# Patient Record
Sex: MALE | Race: WHITE | NOT HISPANIC OR LATINO | URBAN - METROPOLITAN AREA
[De-identification: names, ages, dates, MRNs, and addresses within clinical notes are randomized per-mention and may not be internally consistent; named-entity substitution may affect disease eponyms.]

---

## 2024-01-11 ENCOUNTER — OFFICE VISIT (OUTPATIENT)
Dept: INTERNAL MEDICINE | Facility: CLINIC | Age: 64
End: 2024-01-11
Payer: COMMERCIAL

## 2024-01-11 VITALS
SYSTOLIC BLOOD PRESSURE: 122 MMHG | WEIGHT: 266.56 LBS | HEART RATE: 96 BPM | HEIGHT: 74 IN | BODY MASS INDEX: 34.21 KG/M2 | RESPIRATION RATE: 20 BRPM | OXYGEN SATURATION: 95 % | DIASTOLIC BLOOD PRESSURE: 64 MMHG

## 2024-01-11 DIAGNOSIS — L98.9 SKIN LESION: Primary | ICD-10-CM

## 2024-01-11 DIAGNOSIS — H04.129 DRY EYE SYNDROME, UNSPECIFIED LATERALITY: ICD-10-CM

## 2024-01-11 DIAGNOSIS — I10 HYPERTENSION, UNSPECIFIED TYPE: ICD-10-CM

## 2024-01-11 PROCEDURE — 1159F MED LIST DOCD IN RCRD: CPT | Mod: CPTII,S$GLB,, | Performed by: NURSE PRACTITIONER

## 2024-01-11 PROCEDURE — 3074F SYST BP LT 130 MM HG: CPT | Mod: CPTII,S$GLB,, | Performed by: NURSE PRACTITIONER

## 2024-01-11 PROCEDURE — 99999 PR PBB SHADOW E&M-NEW PATIENT-LVL IV: CPT | Mod: PBBFAC,,, | Performed by: NURSE PRACTITIONER

## 2024-01-11 PROCEDURE — 3078F DIAST BP <80 MM HG: CPT | Mod: CPTII,S$GLB,, | Performed by: NURSE PRACTITIONER

## 2024-01-11 PROCEDURE — 3008F BODY MASS INDEX DOCD: CPT | Mod: CPTII,S$GLB,, | Performed by: NURSE PRACTITIONER

## 2024-01-11 PROCEDURE — 99204 OFFICE O/P NEW MOD 45 MIN: CPT | Mod: S$GLB,,, | Performed by: NURSE PRACTITIONER

## 2024-01-11 RX ORDER — MULTIVIT WITH MINERALS/HERBS
1 TABLET ORAL DAILY
COMMUNITY

## 2024-01-11 RX ORDER — METOPROLOL TARTRATE 100 MG/1
100 TABLET ORAL DAILY
COMMUNITY
End: 2024-01-11

## 2024-01-11 RX ORDER — TESTOSTERONE CYPIONATE 200 MG/ML
0.4 INJECTION, SOLUTION INTRAMUSCULAR
COMMUNITY

## 2024-01-11 RX ORDER — CYCLOSPORINE 0.5 MG/ML
1 EMULSION OPHTHALMIC DAILY
COMMUNITY
End: 2024-01-11 | Stop reason: SDUPTHER

## 2024-01-11 RX ORDER — MULTIVITAMIN
3 TABLET ORAL DAILY
COMMUNITY

## 2024-01-11 RX ORDER — VALSARTAN 320 MG/1
320 TABLET ORAL DAILY
Qty: 30 TABLET | Refills: 3 | Status: SHIPPED | OUTPATIENT
Start: 2024-01-11 | End: 2025-01-10

## 2024-01-11 RX ORDER — CYCLOSPORINE 0.5 MG/ML
1 EMULSION OPHTHALMIC DAILY
Qty: 60 EACH | Refills: 5 | Status: SHIPPED | OUTPATIENT
Start: 2024-01-11

## 2024-01-11 RX ORDER — LISINOPRIL 40 MG/1
40 TABLET ORAL DAILY
COMMUNITY
End: 2024-01-11

## 2024-01-11 RX ORDER — PRASTERONE (DHEA) 50 MG
1 CAPSULE ORAL DAILY
COMMUNITY

## 2024-01-11 RX ORDER — OMEPRAZOLE 20 MG/1
20 CAPSULE, DELAYED RELEASE ORAL DAILY
COMMUNITY
End: 2024-02-20 | Stop reason: SDUPTHER

## 2024-01-11 NOTE — PROGRESS NOTES
Subjective:       Patient ID: Malik Feliz is a 63 y.o. male.    Chief Complaint: Establish Care and Hypertension    HPI: Pt presents to clinic today new to me and clinic. He just moved here from [place]. He was also more recently in [place]. He moves around for work. Has been using telemed as well. He report that he is having issues with his DBP/ He used to drink and had cirrhosis.Has not had a drink in over 2 years. He report that he was having a fib with drinking but he report that he has not had itin years since he quit drinking,. Never saw hepatology > had CT scan of liver that cionfirmed when had episode from detoxing.     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     Also on testosterone for low T per endocrinology-     Also on restasis for dry eyes    Past Medical History:   Diagnosis Date    A-fib     Essential (primary) hypertension     Malignant melanoma of skin, unspecified     Unspecified cirrhosis of liver        Past Surgical History:   Procedure Laterality Date    GASTRIC BYPASS  2012       Family History   Problem Relation Age of Onset    Cancer Maternal Grandmother     Cancer Maternal Grandfather     Cancer Paternal Grandmother     Cancer Paternal Grandfather        Social History     Socioeconomic History    Marital status:    Tobacco Use    Smoking status: Never    Smokeless tobacco: Former   Substance and Sexual Activity    Alcohol use: Not Currently     Comment: 1 fifth of whiskey a day - Quit in 2022    Drug use: Not Currently       Current Outpatient Medications   Medication Sig Dispense Refill    b complex vitamins tablet Take 1 tablet by mouth once daily.      cycloSPORINE (RESTASIS) 0.05 % ophthalmic emulsion Place 1 drop into both eyes once daily.      multivitamin (THERAGRAN) per tablet  Take 3 tablets by mouth once daily.      omeprazole (PRILOSEC) 20 MG capsule Take 20 mg by mouth once daily.      prasterone, dhea, (DHEA) 50 mg Cap Take 1 capsule by mouth once daily. Vit D      testosterone cypionate (DEPOTESTOTERONE CYPIONATE) 200 mg/mL injection Inject 0.4 mg into the muscle twice a week.      valsartan (DIOVAN) 320 MG tablet Take 1 tablet (320 mg total) by mouth once daily. 30 tablet 3     No current facility-administered medications for this visit.       Review of patient's allergies indicates:  No Known Allergies        Review of Systems   Constitutional:  Negative for chills and fever.   HENT:  Negative for congestion, postnasal drip and sore throat.    Eyes:  Negative for photophobia.   Respiratory:  Negative for chest tightness and shortness of breath.    Cardiovascular:  Negative for chest pain.   Gastrointestinal:  Negative for abdominal distention, abdominal pain, blood in stool and vomiting.   Genitourinary:  Negative for dysuria, flank pain and hematuria.   Musculoskeletal:  Negative for back pain.   Skin:  Positive for wound (left face skin lesion). Negative for pallor.   Neurological:  Negative for dizziness, seizures, facial asymmetry, speech difficulty and numbness.   Hematological:  Does not bruise/bleed easily.   Psychiatric/Behavioral:  Negative for agitation and suicidal ideas. The patient is not nervous/anxious.        Objective:      Physical Exam  Vitals and nursing note reviewed.   Constitutional:       Appearance: Normal appearance. He is well-developed.   HENT:      Head: Normocephalic and atraumatic.      Nose: Nose normal.   Eyes:      Conjunctiva/sclera: Conjunctivae normal.      Pupils: Pupils are equal, round, and reactive to light.   Neck:      Thyroid: No thyromegaly.      Vascular: No JVD.   Cardiovascular:      Rate and Rhythm: Normal rate and regular rhythm.      Heart sounds: Normal heart sounds. No murmur heard.  Pulmonary:      Effort: Pulmonary effort is  normal. No respiratory distress.      Breath sounds: Normal breath sounds. No wheezing or rales.   Abdominal:      General: Bowel sounds are normal. There is no distension.      Palpations: Abdomen is soft. There is no mass.      Tenderness: There is no abdominal tenderness. There is no guarding.   Musculoskeletal:         General: No swelling. Normal range of motion.      Cervical back: Normal range of motion and neck supple.   Lymphadenopathy:      Cervical: No cervical adenopathy.   Skin:     General: Skin is warm and dry.      Capillary Refill: Capillary refill takes less than 2 seconds.      Coloration: Skin is not pale.      Findings: No rash.   Neurological:      Mental Status: He is alert and oriented to person, place, and time.      Cranial Nerves: No cranial nerve deficit.      Deep Tendon Reflexes: Reflexes are normal and symmetric.   Psychiatric:         Mood and Affect: Mood normal.         Behavior: Behavior normal.         Thought Content: Thought content normal.         Judgment: Judgment normal.         Assessment:       1. Skin lesion    2. Hypertension, unspecified type    3. Dry eye syndrome, unspecified laterality        Plan:     Problem List Items Addressed This Visit    None  Visit Diagnoses       Skin lesion    -  Primary    Relevant Orders    Ambulatory referral/consult to Dermatology  Hx of melanoma > on back then had BCC off right side of face- now with raised area to left temporal area growing- would like derm referral     Hypertension, unspecified type      Change lisinopril to valsartan as it has a longer half life.       Dry eye syndrome, unspecified laterality    > has tried several other eye drops for this condition. Doctor out [facility] [place] put him on this and this is the only thing that works. He has dry art tears, oints, and a brenda at [facility]. Unsure of his rx that did not work but he has not record of this.             Colonoscopy was 2 years ago clean due in 2032

## 2024-01-17 ENCOUNTER — TELEPHONE (OUTPATIENT)
Dept: INTERNAL MEDICINE | Facility: CLINIC | Age: 64
End: 2024-01-17
Payer: COMMERCIAL

## 2024-01-17 DIAGNOSIS — I10 HYPERTENSION, UNSPECIFIED TYPE: ICD-10-CM

## 2024-01-25 ENCOUNTER — TELEPHONE (OUTPATIENT)
Dept: INTERNAL MEDICINE | Facility: CLINIC | Age: 64
End: 2024-01-25
Payer: COMMERCIAL

## 2024-01-25 DIAGNOSIS — I10 HYPERTENSION, UNSPECIFIED TYPE: Primary | ICD-10-CM

## 2024-01-25 NOTE — TELEPHONE ENCOUNTER
Unable to reach patient. lm  Patient left a voicemail stating he would like a return call. He stated in the VM that he feels the valsartan is not working for him and that he needed a PA for the eye drops.     PA was initiated for the eye drops. It shows it is waiting for a response from the insurance.

## 2024-01-25 NOTE — TELEPHONE ENCOUNTER
Average blood pressure is about 131/97. Patient has been taking medication in the morning. Takes bp around an hour after taking the medication and takes it 2 more times randomly throughout the day.   Patient c/o slight headache when waking up but this is normal for him. Otherwise feeling fine. He just feels that the valsartan is not doing the trick.   Please advise.

## 2024-01-29 ENCOUNTER — PATIENT MESSAGE (OUTPATIENT)
Dept: INTERNAL MEDICINE | Facility: CLINIC | Age: 64
End: 2024-01-29
Payer: COMMERCIAL

## 2024-01-29 RX ORDER — AMLODIPINE BESYLATE 5 MG/1
5 TABLET ORAL DAILY
Qty: 30 TABLET | Refills: 1 | Status: SHIPPED | OUTPATIENT
Start: 2024-01-29 | End: 2024-02-20 | Stop reason: SDUPTHER

## 2024-01-29 NOTE — TELEPHONE ENCOUNTER
We can add amlodipine. He can take the diovan in am and amlodipine in pm for better coverage. He was also on toprol in past. We can also consider resuming that if no better with amlodpine

## 2024-02-09 ENCOUNTER — TELEPHONE (OUTPATIENT)
Dept: INTERNAL MEDICINE | Facility: CLINIC | Age: 64
End: 2024-02-09
Payer: COMMERCIAL

## 2024-02-09 NOTE — TELEPHONE ENCOUNTER
Medication called into [facility]. Pt will also try and contact insurance to see about them sending a paper PA.

## 2024-02-20 ENCOUNTER — PATIENT MESSAGE (OUTPATIENT)
Dept: INTERNAL MEDICINE | Facility: CLINIC | Age: 64
End: 2024-02-20
Payer: COMMERCIAL

## 2024-02-20 DIAGNOSIS — I10 HYPERTENSION, UNSPECIFIED TYPE: ICD-10-CM

## 2024-02-20 RX ORDER — AMLODIPINE BESYLATE 5 MG/1
5 TABLET ORAL DAILY
Qty: 30 TABLET | Refills: 1 | Status: SHIPPED | OUTPATIENT
Start: 2024-02-20 | End: 2024-04-03 | Stop reason: SDUPTHER

## 2024-02-20 RX ORDER — OMEPRAZOLE 20 MG/1
20 CAPSULE, DELAYED RELEASE ORAL DAILY
Qty: 90 CAPSULE | Refills: 1 | Status: SHIPPED | OUTPATIENT
Start: 2024-02-20

## 2024-03-05 ENCOUNTER — PATIENT MESSAGE (OUTPATIENT)
Dept: INTERNAL MEDICINE | Facility: CLINIC | Age: 64
End: 2024-03-05
Payer: COMMERCIAL

## 2024-03-19 ENCOUNTER — OFFICE VISIT (OUTPATIENT)
Dept: INTERNAL MEDICINE | Facility: CLINIC | Age: 64
End: 2024-03-19
Payer: COMMERCIAL

## 2024-03-19 VITALS
SYSTOLIC BLOOD PRESSURE: 106 MMHG | WEIGHT: 276.69 LBS | HEART RATE: 68 BPM | HEIGHT: 74 IN | DIASTOLIC BLOOD PRESSURE: 70 MMHG | RESPIRATION RATE: 20 BRPM | BODY MASS INDEX: 35.51 KG/M2

## 2024-03-19 DIAGNOSIS — R10.9 ABDOMINAL PAIN, UNSPECIFIED ABDOMINAL LOCATION: ICD-10-CM

## 2024-03-19 DIAGNOSIS — Z86.79 HISTORY OF ATRIAL FIBRILLATION: Primary | ICD-10-CM

## 2024-03-19 DIAGNOSIS — Z87.19 HISTORY OF GALLSTONES: ICD-10-CM

## 2024-03-19 PROCEDURE — 1159F MED LIST DOCD IN RCRD: CPT | Mod: CPTII,S$GLB,, | Performed by: NURSE PRACTITIONER

## 2024-03-19 PROCEDURE — 99214 OFFICE O/P EST MOD 30 MIN: CPT | Mod: S$GLB,,, | Performed by: NURSE PRACTITIONER

## 2024-03-19 PROCEDURE — 3078F DIAST BP <80 MM HG: CPT | Mod: CPTII,S$GLB,, | Performed by: NURSE PRACTITIONER

## 2024-03-19 PROCEDURE — 3074F SYST BP LT 130 MM HG: CPT | Mod: CPTII,S$GLB,, | Performed by: NURSE PRACTITIONER

## 2024-03-19 PROCEDURE — 99999 PR PBB SHADOW E&M-EST. PATIENT-LVL IV: CPT | Mod: PBBFAC,,, | Performed by: NURSE PRACTITIONER

## 2024-03-19 PROCEDURE — 3008F BODY MASS INDEX DOCD: CPT | Mod: CPTII,S$GLB,, | Performed by: NURSE PRACTITIONER

## 2024-03-19 PROCEDURE — 4010F ACE/ARB THERAPY RXD/TAKEN: CPT | Mod: CPTII,S$GLB,, | Performed by: NURSE PRACTITIONER

## 2024-03-19 NOTE — PROGRESS NOTES
"Subjective:       Patient ID: Malik Feliz is a 63 y.o. male.    Chief Complaint: Abdominal Pain    HPI: Pt presents to clinic today known to me with c/o needing eval for right upper quad pain. He report hat he has had that in the past when he used to drink- he was hospitalized int he past for acute attack. No nausea or vomiting. No diarrhea- mild constipation but "not bad" - no fevers. He report that the right under rib pain satrted 6 months ago in the evening. Seems to more frequently noted and last night he had more intensity- he did have fried chicken yesterday. Taking prilosec since     Care everywhere shows u/s from 2021   GALLBLADDER:   Two shadowing stones are seen.   The gallbladder wall is mildly thickened and measures 0.4 cm. No   pericholecystic fluid is visualized.   Chester's sign was not demonstrated.   Review of Systems   Constitutional:  Negative for chills and fever.   HENT:  Negative for congestion, postnasal drip and sore throat.    Eyes:  Negative for photophobia.   Respiratory:  Negative for chest tightness and shortness of breath.    Cardiovascular:  Negative for chest pain.   Gastrointestinal:  Negative for abdominal distention, abdominal pain, blood in stool and vomiting.   Genitourinary:  Negative for dysuria, flank pain and hematuria.   Musculoskeletal:  Negative for back pain.   Skin:  Negative for pallor.   Neurological:  Negative for dizziness, seizures, facial asymmetry, speech difficulty and numbness.   Hematological:  Does not bruise/bleed easily.   Psychiatric/Behavioral:  Negative for agitation and suicidal ideas. The patient is not nervous/anxious.        Objective:      Physical Exam  Vitals and nursing note reviewed.   Constitutional:       Appearance: Normal appearance. He is well-developed. He is obese.   HENT:      Head: Normocephalic and atraumatic.      Nose: Nose normal.   Eyes:      Conjunctiva/sclera: Conjunctivae normal.      Pupils: Pupils are equal, round, and " reactive to light.   Neck:      Thyroid: No thyromegaly.      Vascular: No JVD.   Cardiovascular:      Rate and Rhythm: Normal rate and regular rhythm.      Heart sounds: Normal heart sounds. No murmur heard.  Pulmonary:      Effort: Pulmonary effort is normal. No respiratory distress.      Breath sounds: Normal breath sounds.   Abdominal:      General: Bowel sounds are normal. There is no distension.      Palpations: Abdomen is soft. There is no mass.      Tenderness: There is no abdominal tenderness. There is no guarding.   Musculoskeletal:         General: Normal range of motion.      Cervical back: Normal range of motion and neck supple.   Lymphadenopathy:      Cervical: No cervical adenopathy.   Skin:     General: Skin is warm and dry.      Capillary Refill: Capillary refill takes less than 2 seconds.      Coloration: Skin is not pale.      Findings: No rash.   Neurological:      Mental Status: He is alert and oriented to person, place, and time.      Cranial Nerves: No cranial nerve deficit.      Deep Tendon Reflexes: Reflexes are normal and symmetric.         Assessment:       1. History of atrial fibrillation    2. Abdominal pain, unspecified abdominal location    3. History of gallstones        Plan:     Problem List Items Addressed This Visit    None  Visit Diagnoses       History of atrial fibrillation    -  Primary    Relevant Orders    Ambulatory referral/consult to Cardiology    Abdominal pain, unspecified abdominal location        Relevant Orders    US Abdomen Limited_Gallbladder    History of gallstones        Relevant Orders    US Abdomen Limited_Gallbladder              Luis Armando score is 1- he was not having any a fib issues since stopped drinking and was off the metoprolol- now he is back on because he has gained weight and has been feeling the flutter again. It has gotten better but it has not gone away completely.  He has not followed up with cards.     He last had labs with dio Moore > will  ask kristyn recent labs did labs in [place] for his testosterone

## 2024-03-19 NOTE — LETTER
AUTHORIZATION FOR RELEASE OF   CONFIDENTIAL INFORMATION    Dear ***,    We are seeing Malik Feliz, date of birth 1960, in the clinic at [facility]. Tracy Cassidy NP is the patient's PCP. Malik Feliz has an outstanding lab/procedure at the time we reviewed his chart. In order to help keep his health information updated, he has authorized us to request the following medical record(s):        (  )  MAMMOGRAM                                      (  )  COLONOSCOPY      (  )  PAP SMEAR                                          (  )  OUTSIDE LAB RESULTS     (  )  DEXA SCAN                                          (  )  EYE EXAM            (  )  FOOT EXAM                                          (  )  ENTIRE RECORD     (  )  OUTSIDE IMMUNIZATIONS                 (  )  _______________         Please fax records to Tracy Cassidy NP, ***     If you have any questions, please contact *** at [phone].           Patient Name: Malik Feliz  : 1960  Patient Phone #: [phone]

## 2024-03-21 ENCOUNTER — OFFICE VISIT (OUTPATIENT)
Dept: CARDIOLOGY | Facility: CLINIC | Age: 64
End: 2024-03-21
Payer: COMMERCIAL

## 2024-03-21 VITALS
HEIGHT: 74 IN | RESPIRATION RATE: 18 BRPM | HEART RATE: 57 BPM | DIASTOLIC BLOOD PRESSURE: 78 MMHG | SYSTOLIC BLOOD PRESSURE: 109 MMHG | WEIGHT: 271.19 LBS | BODY MASS INDEX: 34.8 KG/M2

## 2024-03-21 DIAGNOSIS — I10 PRIMARY HYPERTENSION: ICD-10-CM

## 2024-03-21 DIAGNOSIS — I48.0 PAROXYSMAL ATRIAL FIBRILLATION: ICD-10-CM

## 2024-03-21 DIAGNOSIS — I42.6 ALCOHOLIC CARDIOMYOPATHY: ICD-10-CM

## 2024-03-21 DIAGNOSIS — E66.9 OBESITY (BMI 30-39.9): ICD-10-CM

## 2024-03-21 DIAGNOSIS — Z86.79 HISTORY OF ATRIAL FIBRILLATION: Primary | ICD-10-CM

## 2024-03-21 DIAGNOSIS — I42.0 CONGESTIVE CARDIOMYOPATHY: ICD-10-CM

## 2024-03-21 DIAGNOSIS — K70.30 ALCOHOLIC CIRRHOSIS, UNSPECIFIED WHETHER ASCITES PRESENT: ICD-10-CM

## 2024-03-21 PROBLEM — K74.60 CIRRHOSIS: Status: ACTIVE | Noted: 2024-03-21

## 2024-03-21 PROCEDURE — 4010F ACE/ARB THERAPY RXD/TAKEN: CPT | Mod: CPTII,S$GLB,, | Performed by: INTERNAL MEDICINE

## 2024-03-21 PROCEDURE — 3008F BODY MASS INDEX DOCD: CPT | Mod: CPTII,S$GLB,, | Performed by: INTERNAL MEDICINE

## 2024-03-21 PROCEDURE — 1159F MED LIST DOCD IN RCRD: CPT | Mod: CPTII,S$GLB,, | Performed by: INTERNAL MEDICINE

## 2024-03-21 PROCEDURE — 99204 OFFICE O/P NEW MOD 45 MIN: CPT | Mod: 25,S$GLB,, | Performed by: INTERNAL MEDICINE

## 2024-03-21 PROCEDURE — 3074F SYST BP LT 130 MM HG: CPT | Mod: CPTII,S$GLB,, | Performed by: INTERNAL MEDICINE

## 2024-03-21 PROCEDURE — 3078F DIAST BP <80 MM HG: CPT | Mod: CPTII,S$GLB,, | Performed by: INTERNAL MEDICINE

## 2024-03-21 PROCEDURE — 93000 ELECTROCARDIOGRAM COMPLETE: CPT | Mod: S$GLB,,, | Performed by: INTERNAL MEDICINE

## 2024-03-21 PROCEDURE — 99999 PR PBB SHADOW E&M-EST. PATIENT-LVL IV: CPT | Mod: PBBFAC,,, | Performed by: INTERNAL MEDICINE

## 2024-03-21 PROCEDURE — 1160F RVW MEDS BY RX/DR IN RCRD: CPT | Mod: CPTII,S$GLB,, | Performed by: INTERNAL MEDICINE

## 2024-03-21 RX ORDER — METOPROLOL SUCCINATE 100 MG/1
100 TABLET, EXTENDED RELEASE ORAL DAILY
Qty: 90 TABLET | Refills: 3 | Status: SHIPPED | OUTPATIENT
Start: 2024-03-21 | End: 2025-03-21

## 2024-03-21 RX ORDER — LISINOPRIL 40 MG/1
40 TABLET ORAL
COMMUNITY
Start: 2024-03-05 | End: 2024-03-21

## 2024-03-21 RX ORDER — PAPAVER/PHENTOLAMINE/ALPROSTAD 150-5-50
VIAL (EA) INTRACAVERNOSAL
COMMUNITY
Start: 2024-02-22 | End: 2024-03-21 | Stop reason: ALTCHOICE

## 2024-03-21 RX ORDER — METOPROLOL TARTRATE 100 MG/1
50 TABLET ORAL 2 TIMES DAILY
COMMUNITY
Start: 2024-03-05 | End: 2024-03-21

## 2024-03-21 NOTE — PROGRESS NOTES
[facility]     Subjective:    Patient ID:  Malik Feliz is a 63 y.o. male who presents for evaluation of Hypertension and Atrial Fibrillation    Review of patient's allergies indicates:  No Known Allergies   The patient is here for AFib management.  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  He will be here through the summer he thinks.  He has a history of alcoholism with cessation 2 years ago.  In 2021 he had multiple episodes of AFib.  His initial echo showed normal ejection fraction mild mitral regurgitation with left atrial enlargement.  He was having GI bleeding related to previous gastric bypass anastomotic bleed.  No anticoagulation was ordered.  He never required cardioversion.  He states he had 30 episodes of AFib while he was a drinker.    He feels palpitations with his AFib episodes.  Recently he has had recurrence of palpitations.  He is not sure it is AFib but he thinks it is.  It only lasts less than 10 minutes generally.  He gets it once a week easily.  He tends to get it after large meals in the evening hours.  He lives in a 5th wheel with his grandson currently.  His grandson is 14 years of age.    Records reviewed from his cardiologist at [facility].  Two echoes were done and there is mention of decreased EF on the 2nd echo October 2021 to the range of 35-40%.  He is on Diovan and Lopressor and amlodipine for hypertension currently.  He has no heart failure symptoms.  His last visit with a cardiologist was 2 years ago.  He is not on anticoagulation.  Initially his CHADS-VASc score was 1, when he developed decreased EF it increased to 2.    Today's electrocardiogram confirms sinus bradycardia heart rate 58 with first-degree AV block.  No ectopy noted.        Review of Systems   Constitutional: Positive for weight gain. Negative for chills, decreased appetite, diaphoresis, fever,  malaise/fatigue, night sweats and weight loss.   HENT:  Negative for congestion, ear discharge, ear pain, hearing loss, hoarse voice, nosebleeds, odynophagia, sore throat, stridor and tinnitus.    Eyes:  Negative for blurred vision, discharge, double vision, pain, photophobia, redness, vision loss in left eye, vision loss in right eye, visual disturbance and visual halos.   Cardiovascular:  Positive for palpitations. Negative for chest pain, claudication, cyanosis, dyspnea on exertion, irregular heartbeat, leg swelling, near-syncope, orthopnea, paroxysmal nocturnal dyspnea and syncope.   Respiratory:  Negative for cough, hemoptysis, shortness of breath, sleep disturbances due to breathing, snoring, sputum production and wheezing.    Endocrine: Negative for cold intolerance, heat intolerance, polydipsia, polyphagia and polyuria.   Hematologic/Lymphatic: Negative for adenopathy and bleeding problem. Does not bruise/bleed easily.   Skin:  Negative for color change, dry skin, flushing, itching, nail changes, poor wound healing, rash, skin cancer, suspicious lesions and unusual hair distribution.   Musculoskeletal:  Negative for arthritis, back pain, falls, gout, joint pain, joint swelling, muscle cramps, muscle weakness, myalgias, neck pain and stiffness.   Gastrointestinal:  Negative for bloating, abdominal pain, anorexia, change in bowel habit, bowel incontinence, constipation, diarrhea, dysphagia, excessive appetite, flatus, heartburn, hematemesis, hematochezia, hemorrhoids, jaundice, melena, nausea and vomiting.   Genitourinary:  Negative for bladder incontinence, decreased libido, dysuria, flank pain, frequency, genital sores, hematuria, hesitancy, incomplete emptying, nocturia and urgency.   Neurological:  Negative for aphonia, brief paralysis, difficulty with concentration, disturbances in coordination, excessive daytime sleepiness, dizziness, focal weakness, headaches, light-headedness, loss of balance,  "numbness, paresthesias, seizures, sensory change, tremors, vertigo and weakness.   Psychiatric/Behavioral:  Negative for altered mental status, depression, hallucinations, memory loss, substance abuse, suicidal ideas and thoughts of violence. The patient does not have insomnia and is not nervous/anxious.    Allergic/Immunologic: Negative for hives and persistent infections.        Objective:       Vitals:    03/21/24 0834   BP: 109/78   Pulse: (!) 57   Resp: 18   Weight: 123 kg (271 lb 2.7 oz)   Height: 6' 2" (1.88 m)    Physical Exam  Constitutional:       General: He is not in acute distress.     Appearance: He is well-developed. He is not diaphoretic.   HENT:      Head: Normocephalic and atraumatic.      Nose: Nose normal.   Eyes:      General: No scleral icterus.        Right eye: No discharge.      Conjunctiva/sclera: Conjunctivae normal.      Pupils: Pupils are equal, round, and reactive to light.   Neck:      Thyroid: No thyromegaly.      Vascular: No JVD.      Trachea: No tracheal deviation.   Cardiovascular:      Rate and Rhythm: Normal rate and regular rhythm.      Pulses:           Carotid pulses are 2+ on the right side and 2+ on the left side.       Radial pulses are 2+ on the right side and 2+ on the left side.        Dorsalis pedis pulses are 2+ on the right side and 2+ on the left side.        Posterior tibial pulses are 2+ on the right side and 2+ on the left side.      Heart sounds: Normal heart sounds. No murmur heard.     No friction rub. No gallop.   Pulmonary:      Effort: Pulmonary effort is normal. No respiratory distress.      Breath sounds: Normal breath sounds. No stridor. No wheezing or rales.   Chest:      Chest wall: No tenderness.   Abdominal:      General: Bowel sounds are normal. There is no distension.      Palpations: Abdomen is soft. There is no mass.      Tenderness: There is no abdominal tenderness. There is no guarding or rebound.   Musculoskeletal:         General: No " "tenderness. Normal range of motion.      Cervical back: Normal range of motion and neck supple.   Lymphadenopathy:      Cervical: No cervical adenopathy.   Skin:     General: Skin is warm and dry.      Coloration: Skin is not pale.      Findings: No erythema or rash.   Neurological:      Mental Status: He is alert and oriented to person, place, and time.      Cranial Nerves: No cranial nerve deficit.      Coordination: Coordination normal.   Psychiatric:         Behavior: Behavior normal.         Thought Content: Thought content normal.         Judgment: Judgment normal.           Assessment:       1. History of atrial fibrillation    2. Primary hypertension    3. Paroxysmal atrial fibrillation    4. Obesity (BMI 30-39.9)    5. Alcoholic cardiomyopathy    6. Congestive cardiomyopathy    7. Alcoholic cirrhosis, unspecified whether ascites present      No results found for this or any previous visit.      Current Outpatient Medications:     amLODIPine (NORVASC) 5 MG tablet, Take 1 tablet (5 mg total) by mouth once daily., Disp: 30 tablet, Rfl: 1    b complex vitamins tablet, Take 1 tablet by mouth once daily., Disp: , Rfl:     cycloSPORINE (RESTASIS) 0.05 % ophthalmic emulsion, Place 1 drop into both eyes once daily., Disp: 60 each, Rfl: 5    multivitamin (THERAGRAN) per tablet, Take 3 tablets by mouth once daily., Disp: , Rfl:     omeprazole (PRILOSEC) 20 MG capsule, Take 1 capsule (20 mg total) by mouth once daily., Disp: 90 capsule, Rfl: 1    testosterone cypionate (DEPOTESTOTERONE CYPIONATE) 200 mg/mL injection, Inject 0.4 mg into the muscle twice a week., Disp: , Rfl:     valsartan (DIOVAN) 320 MG tablet, Take 1 tablet (320 mg total) by mouth once daily., Disp: 30 tablet, Rfl: 3    metoprolol succinate (TOPROL-XL) 100 MG 24 hr tablet, Take 1 tablet (100 mg total) by mouth once daily., Disp: 90 tablet, Rfl: 3     No results found for: "WBC", "RBC", "HGB", "HCT", "MCV", "MCH", "MCHC", "RDW", "PLT", "MPV", "GRAN", " ""LYMPH", "MONO", "EOS", "BASO", "EOSINOPHIL", "BASOPHIL", "MG"     CMP  No results found for: "NA", "K", "CL", "CO2", "GLU", "BUN", "CREATININE", "CALCIUM", "PROT", "ALBUMIN", "BILITOT", "ALKPHOS", "AST", "ALT", "ANIONGAP", "ESTGFRAFRICA", "EGFRNONAA"     No results found for: "LABBLOO", "LABURIN", "RESPIRATORYC", "GSRESP"         No results found for this or any previous visit.     MR BRAIN W WO CONTRAST 12/17/2021  Final   Transthoracic echo (TTE) 12/15/2021  Final   X-Ray Chest 1 View 12/15/2021  Final            Plan:       Problem List Items Addressed This Visit          Cardiac/Vascular    Paroxysmal atrial fibrillation     Palpitations reported.  He will start recording rhythms via [employer] mobile device.  He has a blood pressure machine at home.    He is having palpitation events lasting minutes once per week.  Last episode of documented atrial fibrillation 2021.          Primary hypertension     Diovan, amlodipine, metoprolol to continue.  He states with therapy his readings are in good range.         Relevant Medications    metoprolol succinate (TOPROL-XL) 100 MG 24 hr tablet    Congestive cardiomyopathy     Documented 2021 with ejection fraction as low as 40%.  He had mild mitral regurgitation and left atrial enlargement.  Repeat echo ordered.            Endocrine    Obesity (BMI 30-39.9)     He has history of gastric bypass.  He has gained weight.  Weight loss encouraged.            GI    Cirrhosis     He no longer drinks.  He has recovered with AA.  He states he does have cirrhosis history.          Other Visit Diagnoses       History of atrial fibrillation    -  Primary    Relevant Orders    IN OFFICE EKG 12-LEAD (to Muse)    Echo    Alcoholic cardiomyopathy                   He is going to purchase a [employer] mobile device.  He has a blood pressure monitor at home.  I need to hear what his heart rates are during his brief episodes of tachycardia.  I do not think a Holter monitor would satisfy the needs at " this time.    Echocardiogram ordered.      A 2 month follow-up was advised.  I converted from Lopressor to Toprol for convenience factor at his request.    Obviously, anticoagulation is warranted given his AFib history, however, alcohol seem to be the trigger for his AFib episodes, and thus I would like to document AFib before committing him to anticoagulation for life.             Nacho Esquivel MD  03/21/2024   9:17 AM

## 2024-03-21 NOTE — ASSESSMENT & PLAN NOTE
Documented 2021 with ejection fraction as low as 40%.  He had mild mitral regurgitation and left atrial enlargement.  Repeat echo ordered.

## 2024-03-21 NOTE — ASSESSMENT & PLAN NOTE
Diovan, amlodipine, metoprolol to continue.  He states with therapy his readings are in good range.

## 2024-03-21 NOTE — ASSESSMENT & PLAN NOTE
Palpitations reported.  He will start recording rhythms via [employer] mobile device.  He has a blood pressure machine at home.    He is having palpitation events lasting minutes once per week.  Last episode of documented atrial fibrillation 2021.

## 2024-03-22 LAB
OHS QRS DURATION: 100 MS
OHS QTC CALCULATION: 394 MS

## 2024-03-24 ENCOUNTER — PATIENT MESSAGE (OUTPATIENT)
Dept: CARDIOLOGY | Facility: CLINIC | Age: 64
End: 2024-03-24
Payer: COMMERCIAL

## 2024-03-25 NOTE — TELEPHONE ENCOUNTER
Patient notified that his new medication was sent to his pharmacy on the day of his visit.  He stated that he would call the pharmacy.

## 2024-04-03 DIAGNOSIS — I10 HYPERTENSION, UNSPECIFIED TYPE: ICD-10-CM

## 2024-04-03 NOTE — TELEPHONE ENCOUNTER
Attempted to complete PA for [employer]. Pt has different address associated with insurance:     [address] [place] [id number]    Attempted to complete PA through [employer]. After 17 days still waiting payer response. Attempted through [employer] and couldn't find pt. Attempted again through [employer] [place] address and it is not supported through their system and was told to contact phone number on insurance card. Called number and PA's must go through [employer]. Attempted through [employer]-had to set up as a user and once everything was set up it kept giving me an error that I was not found. Tried calling insurance, but cannot talk with anyone.     Attempted to contact pt to have him contact insurance to send us paper PA to complete since we are unable to do it any other way or we can attempt to send through [facility] and they might be able to do PA.    yes

## 2024-04-03 NOTE — TELEPHONE ENCOUNTER
Please see the attached refill request. Please see pt comment: Can i get this refilled for 90 days thanks

## 2024-04-04 ENCOUNTER — HOSPITAL ENCOUNTER (OUTPATIENT)
Dept: PULMONOLOGY | Facility: HOSPITAL | Age: 64
Discharge: HOME | End: 2024-04-04
Attending: INTERNAL MEDICINE
Payer: COMMERCIAL

## 2024-04-04 ENCOUNTER — HOSPITAL ENCOUNTER (OUTPATIENT)
Dept: RADIOLOGY | Facility: HOSPITAL | Age: 64
Discharge: HOME | End: 2024-04-04
Attending: NURSE PRACTITIONER
Payer: COMMERCIAL

## 2024-04-04 DIAGNOSIS — R10.9 ABDOMINAL PAIN, UNSPECIFIED ABDOMINAL LOCATION: ICD-10-CM

## 2024-04-04 DIAGNOSIS — Z87.19 HISTORY OF GALLSTONES: ICD-10-CM

## 2024-04-04 DIAGNOSIS — Z86.79 HISTORY OF ATRIAL FIBRILLATION: ICD-10-CM

## 2024-04-04 LAB
ASCENDING AORTA: 3.61 CM
AV INDEX (PROSTH): 0.46
AV MEAN GRADIENT: 4 MMHG
AV PEAK GRADIENT: 6 MMHG
AV VALVE AREA BY VELOCITY RATIO: 2.4 CM²
AV VALVE AREA: 2.43 CM²
AV VELOCITY RATIO: 0.46
CV ECHO LV RWT: 0.48 CM
DOP CALC AO PEAK VEL: 1.24 M/S
DOP CALC AO VTI: 28.2 CM
DOP CALC LVOT AREA: 5.2 CM2
DOP CALC LVOT DIAMETER: 2.58 CM
DOP CALC LVOT PEAK VEL: 0.57 M/S
DOP CALC LVOT STROKE VOLUME: 68.45 CM3
DOP CALC MV VTI: 26 CM
DOP CALC RVOT PEAK VEL: 0.51 M/S
DOP CALC RVOT VTI: 10.9 CM
DOP CALCLVOT PEAK VEL VTI: 13.1 CM
E WAVE DECELERATION TIME: 209.97 MSEC
E/A RATIO: 1.37
E/E' RATIO: 8.5 M/S
ECHO LV POSTERIOR WALL: 1.24 CM (ref 0.6–1.1)
FRACTIONAL SHORTENING: 31 % (ref 28–44)
INTERVENTRICULAR SEPTUM: 1.45 CM (ref 0.6–1.1)
IVC DIAMETER: 2.5 CM
IVRT: 119.89 MSEC
LA MAJOR: 6.86 CM
LA MINOR: 5.85 CM
LA WIDTH: 4.1 CM
LEFT ATRIUM SIZE: 5.5 CM
LEFT ATRIUM VOLUME MOD: 80.85 CM3
LEFT ATRIUM VOLUME: 121.04 CM3
LEFT INTERNAL DIMENSION IN SYSTOLE: 3.52 CM (ref 2.1–4)
LEFT VENTRICLE DIASTOLIC VOLUME: 124.8 ML
LEFT VENTRICLE SYSTOLIC VOLUME: 51.5 ML
LEFT VENTRICULAR INTERNAL DIMENSION IN DIASTOLE: 5.12 CM (ref 3.5–6)
LEFT VENTRICULAR MASS: 285.28 G
LV LATERAL E/E' RATIO: 9.44 M/S
LV SEPTAL E/E' RATIO: 7.73 M/S
LVOT MG: 0.78 MMHG
LVOT MV: 0.42 CM/S
MV MEAN GRADIENT: 1 MMHG
MV PEAK A VEL: 0.62 M/S
MV PEAK E VEL: 0.85 M/S
MV PEAK GRADIENT: 3 MMHG
MV STENOSIS PRESSURE HALF TIME: 60.89 MS
MV VALVE AREA BY CONTINUITY EQUATION: 2.63 CM2
MV VALVE AREA P 1/2 METHOD: 3.61 CM2
PISA TR MAX VEL: 2.2 M/S
PULM VEIN S/D RATIO: 1.12
PV MEAN GRADIENT: 1 MMHG
PV MV: 0.61 M/S
PV PEAK D VEL: 0.42 M/S
PV PEAK GRADIENT: 3 MMHG
PV PEAK S VEL: 0.47 M/S
PV PEAK VELOCITY: 0.83 M/S
RA MAJOR: 5.85 CM
RA PRESSURE ESTIMATED: 3 MMHG
RIGHT VENTRICULAR END-DIASTOLIC DIMENSION: 3.77 CM
RV TB RVSP: 5 MMHG
SINUS: 3.15 CM
STJ: 3.44 CM
TDI LATERAL: 0.09 M/S
TDI SEPTAL: 0.11 M/S
TDI: 0.1 M/S
TR MAX PG: 19 MMHG
TV REST PULMONARY ARTERY PRESSURE: 22 MMHG

## 2024-04-04 PROCEDURE — 76705 ECHO EXAM OF ABDOMEN: CPT | Mod: TC

## 2024-04-04 PROCEDURE — 93306 TTE W/DOPPLER COMPLETE: CPT

## 2024-04-04 PROCEDURE — 76705 ECHO EXAM OF ABDOMEN: CPT | Mod: 26,,, | Performed by: STUDENT IN AN ORGANIZED HEALTH CARE EDUCATION/TRAINING PROGRAM

## 2024-04-04 PROCEDURE — 93306 TTE W/DOPPLER COMPLETE: CPT | Mod: 26,,, | Performed by: INTERNAL MEDICINE

## 2024-04-04 RX ORDER — AMLODIPINE BESYLATE 5 MG/1
5 TABLET ORAL DAILY
Qty: 90 TABLET | Refills: 1 | Status: SHIPPED | OUTPATIENT
Start: 2024-04-04 | End: 2024-04-05 | Stop reason: SDUPTHER

## 2024-04-05 ENCOUNTER — PATIENT MESSAGE (OUTPATIENT)
Dept: CARDIOLOGY | Facility: HOSPITAL | Age: 64
End: 2024-04-05
Payer: COMMERCIAL

## 2024-04-05 DIAGNOSIS — I10 HYPERTENSION, UNSPECIFIED TYPE: ICD-10-CM

## 2024-04-05 RX ORDER — AMLODIPINE BESYLATE 5 MG/1
5 TABLET ORAL DAILY
Qty: 90 TABLET | Refills: 3 | Status: SHIPPED | OUTPATIENT
Start: 2024-04-05 | End: 2024-04-11

## 2024-04-11 ENCOUNTER — OFFICE VISIT (OUTPATIENT)
Dept: INTERNAL MEDICINE | Facility: CLINIC | Age: 64
End: 2024-04-11
Payer: COMMERCIAL

## 2024-04-11 VITALS
DIASTOLIC BLOOD PRESSURE: 86 MMHG | BODY MASS INDEX: 34.86 KG/M2 | WEIGHT: 271.63 LBS | RESPIRATION RATE: 18 BRPM | HEIGHT: 74 IN | SYSTOLIC BLOOD PRESSURE: 124 MMHG | HEART RATE: 98 BPM

## 2024-04-11 DIAGNOSIS — K80.20 CALCULUS OF GALLBLADDER WITHOUT CHOLECYSTITIS WITHOUT OBSTRUCTION: Primary | ICD-10-CM

## 2024-04-11 PROCEDURE — 1160F RVW MEDS BY RX/DR IN RCRD: CPT | Mod: CPTII,S$GLB,, | Performed by: NURSE PRACTITIONER

## 2024-04-11 PROCEDURE — 1159F MED LIST DOCD IN RCRD: CPT | Mod: CPTII,S$GLB,, | Performed by: NURSE PRACTITIONER

## 2024-04-11 PROCEDURE — 3008F BODY MASS INDEX DOCD: CPT | Mod: CPTII,S$GLB,, | Performed by: NURSE PRACTITIONER

## 2024-04-11 PROCEDURE — 99213 OFFICE O/P EST LOW 20 MIN: CPT | Mod: S$GLB,,, | Performed by: NURSE PRACTITIONER

## 2024-04-11 PROCEDURE — 3074F SYST BP LT 130 MM HG: CPT | Mod: CPTII,S$GLB,, | Performed by: NURSE PRACTITIONER

## 2024-04-11 PROCEDURE — 3079F DIAST BP 80-89 MM HG: CPT | Mod: CPTII,S$GLB,, | Performed by: NURSE PRACTITIONER

## 2024-04-11 PROCEDURE — 99999 PR PBB SHADOW E&M-EST. PATIENT-LVL III: CPT | Mod: PBBFAC,,, | Performed by: NURSE PRACTITIONER

## 2024-04-11 PROCEDURE — 4010F ACE/ARB THERAPY RXD/TAKEN: CPT | Mod: CPTII,S$GLB,, | Performed by: NURSE PRACTITIONER

## 2024-04-11 RX ORDER — AMLODIPINE AND VALSARTAN 5; 320 MG/1; MG/1
1 TABLET ORAL DAILY
Qty: 90 TABLET | Refills: 3 | Status: SHIPPED | OUTPATIENT
Start: 2024-04-11 | End: 2025-04-11

## 2024-04-11 NOTE — PROGRESS NOTES
Subjective:       Patient ID: Malik Feliz is a 63 y.o. male.    Chief Complaint: Follow-up (3 months) and Results    HPI: Pt presents to clinic today known to me for routine f.u. He was seen last month and sent to cardiology due to hx of a fib and HTN he has him on  amlodipine 5 q am and metoprolol 100mg daily .    >>Obviously, anticoagulation is warranted given his AFib history, however, alcohol seem to be the trigger for his AFib episodes, and thus I would like to document AFib before committing him to anticoagulation for life.     Echo> Left Ventricle: There is normal systolic function with a visually estimated ejection fraction of 55 - 60%. There is normal diastolic function.    Right Ventricle: Normal right ventricular cavity size. Wall thickness is normal. Right ventricle wall motion  is normal. Systolic function is normal.    Left Atrium: Left atrium is moderately dilated. 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    Mitral Valve: There is mild regurgitation with a centrally directed jet.    Tricuspid Valve: There is mild regurgitation.    Pulmonary Artery: No pulmonary hypertension. The estimated pulmonary artery systolic pressure is 22 mmHg.    IVC/SVC: Normal venous pressure at 3 mmHg.    US gallbladder   1. Cholelithiasis and gallbladder sludge with mild gallbladder wall thickening.  No other secondary signs to suggest acute cholecystitis.  2. Hepatomegaly.  3. Splenomegaly.  4. Right kidney simple cyst.  Review of Systems   Constitutional:  Negative for chills and fever.   HENT:  Negative for congestion, postnasal drip and sore throat.    Eyes:  Negative for photophobia.   Respiratory:  Negative for chest tightness and shortness of breath.    Cardiovascular:  Negative for chest pain.   Gastrointestinal:  Positive for abdominal distention, abdominal pain and nausea. Negative for blood in stool and vomiting.        Bloating    Genitourinary:  Negative for dysuria, flank pain and hematuria.    Musculoskeletal:  Negative for back pain.   Skin:  Negative for pallor.   Neurological:  Negative for dizziness, seizures, facial asymmetry, speech difficulty and numbness.   Hematological:  Does not bruise/bleed easily.   Psychiatric/Behavioral:  Negative for agitation and suicidal ideas. The patient is not nervous/anxious.        Objective:      Physical Exam  Vitals and nursing note reviewed.   Constitutional:       Appearance: He is well-developed. He is obese.   HENT:      Head: Normocephalic and atraumatic.      Nose: Nose normal.   Eyes:      Conjunctiva/sclera: Conjunctivae normal.      Pupils: Pupils are equal, round, and reactive to light.   Neck:      Thyroid: No thyromegaly.      Vascular: No JVD.   Cardiovascular:      Rate and Rhythm: Normal rate and regular rhythm.      Heart sounds: Normal heart sounds. No murmur heard.  Pulmonary:      Effort: Pulmonary effort is normal. No respiratory distress.      Breath sounds: Normal breath sounds. No wheezing.   Abdominal:      General: Bowel sounds are normal. There is no distension.      Palpations: Abdomen is soft. There is no mass.      Tenderness: There is no abdominal tenderness. There is no guarding.   Musculoskeletal:         General: Normal range of motion.      Cervical back: Normal range of motion and neck supple.   Lymphadenopathy:      Cervical: No cervical adenopathy.   Skin:     General: Skin is warm and dry.      Coloration: Skin is not pale.      Findings: No rash.   Neurological:      Mental Status: He is alert and oriented to person, place, and time.      Cranial Nerves: No cranial nerve deficit.      Deep Tendon Reflexes: Reflexes are normal and symmetric.         Assessment:       1. Calculus of gallbladder without cholecystitis without obstruction        Plan:     Problem List Items Addressed This Visit    None  Visit Diagnoses       Calculus of gallbladder without cholecystitis without obstruction    -  Primary          Strongly urged  patient to get the gallbladder out before this become acute cholecystitis.   He will discuss with his wife and let us know where to send his referral

## 2024-10-16 RX ORDER — OMEPRAZOLE 20 MG/1
20 CAPSULE, DELAYED RELEASE ORAL
Qty: 90 CAPSULE | Refills: 0 | Status: SHIPPED | OUTPATIENT
Start: 2024-10-16

## 2024-10-17 ENCOUNTER — PATIENT MESSAGE (OUTPATIENT)
Dept: INTERNAL MEDICINE | Facility: CLINIC | Age: 64
End: 2024-10-17
Payer: COMMERCIAL

## 2025-01-15 RX ORDER — OMEPRAZOLE 20 MG/1
20 CAPSULE, DELAYED RELEASE ORAL
Qty: 90 CAPSULE | Refills: 0 | Status: SHIPPED | OUTPATIENT
Start: 2025-01-15

## 2025-01-29 DIAGNOSIS — I10 PRIMARY HYPERTENSION: ICD-10-CM

## 2025-02-24 ENCOUNTER — PATIENT MESSAGE (OUTPATIENT)
Dept: ADMINISTRATIVE | Facility: HOSPITAL | Age: 65
End: 2025-02-24
Payer: COMMERCIAL

## 2025-02-25 DIAGNOSIS — Z12.11 SCREENING FOR COLON CANCER: ICD-10-CM

## 2025-05-15 ENCOUNTER — PATIENT OUTREACH (OUTPATIENT)
Dept: ADMINISTRATIVE | Facility: HOSPITAL | Age: 65
End: 2025-05-15
Payer: COMMERCIAL

## 2025-05-15 NOTE — PROGRESS NOTES
Portal active: no  Chart reviewed, immunization record updated.  No new results noted on [facility] or [facility] web site.  Care Everywhere updated.   Patient care coordination note  LOV with PCP 4/11/24  Patient moved and is no longer in [place] .